# Patient Record
Sex: MALE | URBAN - METROPOLITAN AREA
[De-identification: names, ages, dates, MRNs, and addresses within clinical notes are randomized per-mention and may not be internally consistent; named-entity substitution may affect disease eponyms.]

---

## 2025-02-24 ENCOUNTER — DOCUMENTATION (OUTPATIENT)
Dept: CARDIOLOGY | Facility: HOSPITAL | Age: 80
End: 2025-02-24

## 2025-02-24 NOTE — PROGRESS NOTES
Coronary angiography was reviewed by interventional cardiology and cardiac surgery teams at the high risk coronary intervention meeting on  2/24/2025  . Decision was made to pursue consult with Dr. Smith for possible DCB to ostial LAD. Distal LAD is a poor surgical target.      Physicians present:  Dr. Omero Nicolas    Need for rediscussion: NO

## 2025-02-24 NOTE — PROGRESS NOTES
[facility] MEETING   Date: 2/24/2025    Patient: Miles Moncada  MRN: [id number]  Referring: Dr. Patterson        HPI: 79-year-old male with a past medical history of breast cancer status post surgical mastectomy on tamoxifen, prostate cancer who is reportedly very active gentleman who underwent a coronary artery calcium score that demonstrated left main and LAD disease.  He had a transthoracic echocardiogram that demonstrated an LVEF of 75%.  He did undergo stress testing where he performed 6.5 METS without evidence of ischemia by report.  He ended up having invasive coronary angiography that demonstrated angiographically significant left main and proximal LAD stenosis.  Patient is currently asymptomatic.    Physicians and Surgeons in attendance: Karlene Patterson, Sergio Nicolas, Sarah, Tio, Shaikh Travis     After discussion, we propose the following strategy: We recommend having patient follow-up in Dr. Piedra's clinic for discussion of revascularization of left main and left anterior descending artery.    Need for rediscussion after 30 days:  N